# Patient Record
(demographics unavailable — no encounter records)

---

## 2025-05-30 NOTE — PHYSICAL EXAM
[NL (150)] : flexion 150 degrees [NL (0)] : extension 0 degrees [Pain with Extension] : pain with extension [5___] : flexion 5[unfilled]/5 [4___] : supination 4[unfilled]/5 [Right] : right elbow [There are no fractures, subluxations or dislocations. No significant abnormalities are seen] : There are no fractures, subluxations or dislocations. No significant abnormalities are seen [] : no tenderness over medial epicondyle

## 2025-05-30 NOTE — HISTORY OF PRESENT ILLNESS
[Right Arm] : right arm [Result of repetitive motion] : result of repetitive motion [6] : 6 [3] : 3 [Localized] : localized [Work] : work [Ice] : ice [Heat] : heat [Nothing helps with pain getting better] : Nothing helps with pain getting better [Full time] : Work status: full time [de-identified] : 37 year old RHD male with pain in the right biceps, symptoms have been present for the past several months, he was lifting weights at the gym when symptoms started. He has been icing, compression sleeve, working with the  and stretching, remains weak and symptomatic. No numbness tingling into the upper extremity.  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

## 2025-05-30 NOTE — DISCUSSION/SUMMARY
[de-identified] : MRI right elbow to evaluate the biceps tendon.  RICE discussed.    RE:  ENDER WILHELM   [id number]   Attention:  Nurse Reviewer /Medical Director    Based on my patient's condition, I strongly believe that the MRI R elbow is medically.necessary.   The patient has failed oral meds, injections and PT and conservative treatment in combination or by themselves and therefore needs the MRI.   The MRI will dictate further treatment t recommendations.